# Patient Record
Sex: FEMALE
[De-identification: names, ages, dates, MRNs, and addresses within clinical notes are randomized per-mention and may not be internally consistent; named-entity substitution may affect disease eponyms.]

---

## 2020-11-12 ENCOUNTER — DASHBOARD ENCOUNTERS (OUTPATIENT)
Age: 65
End: 2020-11-12

## 2020-11-20 ENCOUNTER — OFFICE VISIT (OUTPATIENT)
Dept: URBAN - METROPOLITAN AREA TELEHEALTH 2 | Facility: TELEHEALTH | Age: 65
End: 2020-11-20

## 2020-11-20 RX ORDER — PRAVASTATIN SODIUM 40 MG/1
1 TABLET TABLET ORAL ONCE A DAY
Status: ACTIVE | COMMUNITY

## 2020-11-20 RX ORDER — ESTRADIOL 0.1 MG/G
AS DIRECTED CREAM VAGINAL
Status: ACTIVE | COMMUNITY

## 2020-11-20 RX ORDER — CHOLECALCIFEROL (VITAMIN D3) 125 MCG
AS DIRECTED CAPSULE ORAL
Status: ACTIVE | COMMUNITY

## 2020-11-20 RX ORDER — LEVOTHYROXINE SODIUM 0.03 MG/1
1 TABLET IN THE MORNING ON AN EMPTY STOMACH TABLET ORAL ONCE A DAY
Status: ACTIVE | COMMUNITY

## 2021-02-04 ENCOUNTER — APPOINTMENT (RX ONLY)
Dept: URBAN - METROPOLITAN AREA CLINIC 45 | Facility: CLINIC | Age: 66
Setting detail: DERMATOLOGY
End: 2021-02-04

## 2021-02-04 DIAGNOSIS — L82.0 INFLAMED SEBORRHEIC KERATOSIS: ICD-10-CM

## 2021-02-04 PROBLEM — D48.5 NEOPLASM OF UNCERTAIN BEHAVIOR OF SKIN: Status: ACTIVE | Noted: 2021-02-04

## 2021-02-04 PROCEDURE — 11102 TANGNTL BX SKIN SINGLE LES: CPT

## 2021-02-04 PROCEDURE — ? ADDITIONAL NOTES

## 2021-02-04 PROCEDURE — ? BIOPSY BY SHAVE METHOD

## 2021-02-04 ASSESSMENT — LOCATION ZONE DERM: LOCATION ZONE: ARM

## 2021-02-04 ASSESSMENT — LOCATION DETAILED DESCRIPTION DERM: LOCATION DETAILED: LEFT POSTERIOR SHOULDER

## 2021-02-04 ASSESSMENT — LOCATION SIMPLE DESCRIPTION DERM: LOCATION SIMPLE: LEFT SHOULDER

## 2021-02-04 NOTE — HPI: SKIN LESION
What Type Of Note Output Would You Prefer (Optional)?: Bullet Format
How Severe Is Your Skin Lesion?: mild
Has Your Skin Lesion Been Treated?: not been treated
Is This A New Presentation, Or A Follow-Up?: Skin Lesion
Additional History: NP